# Patient Record
Sex: FEMALE | Race: WHITE
[De-identification: names, ages, dates, MRNs, and addresses within clinical notes are randomized per-mention and may not be internally consistent; named-entity substitution may affect disease eponyms.]

---

## 2017-10-19 ENCOUNTER — HOSPITAL ENCOUNTER (OUTPATIENT)
Dept: HOSPITAL 47 - RADMAMWWP | Age: 72
Discharge: HOME | End: 2017-10-19
Payer: MEDICARE

## 2017-10-19 DIAGNOSIS — Z08: Primary | ICD-10-CM

## 2017-10-19 DIAGNOSIS — Z85.3: ICD-10-CM

## 2017-10-19 NOTE — MM
Reason for exam: additional evaluation requested from prior study.

Last mammogram was performed 1 year ago.



History:

Patient is postmenopausal and has history of breast cancer at age 57.

Family history of breast cancer in maternal aunt, breast cancer in mother at age 

79, and breast cancer in paternal aunt.

Malignant stereotactic core biopsy of the right breast, May 8, 2003.  Mastectomy 

of the right breast, 2003.  TRAM Reconstruction of the right breast, 2003.  Core 

biopsy of the right breast.

Took estrogen for 2 years beginning at age 55.  Took antineoplastic for 5 years 

beginning at age 57.



Physical Findings:

Nurse did not find any significant physical abnormalities on exam.



MG 3D Diag Mammo W/Cad LT

CC, MLO, and ML view(s) were taken of the left breast.

Prior study comparison: October 17, 2016, left breast MG diagnostic mammo LT w 

CAD.  August 19, 2015, left breast MG diagnostic mammo LT w CAD.

The breast tissue is heterogeneously dense. This may lower the sensitivity of 

mammography.

Finding: There are typically benign fine diffuse/scattered calcifications in the 

left breast.

No significant changes in finding since October 17, 2016 and August 19, 2015.



These results were verbally communicated with the patient and result sheet given 

to the patient on 10/19/17.





ASSESSMENT: Benign, BI-RAD 2



RECOMMENDATION:

Follow-up diagnostic mammogram of the left breast in 1 year.

## 2018-09-20 ENCOUNTER — HOSPITAL ENCOUNTER (OUTPATIENT)
Dept: HOSPITAL 47 - LABWHC1 | Age: 73
Discharge: HOME | End: 2018-09-20
Attending: INTERNAL MEDICINE
Payer: MEDICARE

## 2018-09-20 DIAGNOSIS — R60.9: ICD-10-CM

## 2018-09-20 DIAGNOSIS — I48.1: Primary | ICD-10-CM

## 2018-09-20 LAB
ANION GAP SERPL CALC-SCNC: 4 MMOL/L
BUN SERPL-SCNC: 19 MG/DL (ref 7–17)
CALCIUM SPEC-MCNC: 9.6 MG/DL (ref 8.4–10.2)
CHLORIDE SERPL-SCNC: 107 MMOL/L (ref 98–107)
CO2 SERPL-SCNC: 31 MMOL/L (ref 22–30)
GLUCOSE SERPL-MCNC: 89 MG/DL (ref 74–99)
POTASSIUM SERPL-SCNC: 4.3 MMOL/L (ref 3.5–5.1)
SODIUM SERPL-SCNC: 142 MMOL/L (ref 137–145)

## 2018-09-20 PROCEDURE — 36415 COLL VENOUS BLD VENIPUNCTURE: CPT

## 2018-09-20 PROCEDURE — 80048 BASIC METABOLIC PNL TOTAL CA: CPT

## 2018-10-23 ENCOUNTER — HOSPITAL ENCOUNTER (OUTPATIENT)
Dept: HOSPITAL 47 - RADMAMWWP | Age: 73
Discharge: HOME | End: 2018-10-23
Payer: MEDICARE

## 2018-10-23 DIAGNOSIS — Z08: Primary | ICD-10-CM

## 2018-10-23 DIAGNOSIS — Z85.3: ICD-10-CM

## 2018-10-23 PROCEDURE — 77065 DX MAMMO INCL CAD UNI: CPT

## 2018-10-23 PROCEDURE — 77061 BREAST TOMOSYNTHESIS UNI: CPT

## 2018-10-24 NOTE — MM
Reason for exam: additional evaluation requested from prior study.

Last mammogram was performed 1 year ago.



History:

Patient is postmenopausal and has history of breast cancer at age 57.

Family history of breast cancer in maternal aunt, breast cancer in mother at age 

79, and breast cancer in paternal aunt.

Malignant stereotactic core biopsy of the right breast, May 8, 2003.  Mastectomy 

of the right breast, 2003.  TRAM Reconstruction of the right breast, 2003.  Core 

biopsy of the right breast.

Took estrogen for 2 years beginning at age 55.  Took antineoplastic for 5 years 

beginning at age 57.



Physical Findings:

Nurse did not find any significant physical abnormalities on exam.



MG 3D Diag Mammo W/Cad LT

CC and MLO view(s) were taken of the left breast.

Prior study comparison: October 19, 2017, left breast MG 3d diag mammo w/cad LT.  

October 17, 2016, left breast MG diagnostic mammo LT w CAD.

The breast tissue is heterogeneously dense. This may lower the sensitivity of 

mammography.  There is a new group of left upper outer quadrant calcifications at 

posterior depth measuring 6mm. These are suspicious and warrant biopsy.



These results were verbally communicated with the patient and result sheet given 

to the patient on 10/23/18.





ASSESSMENT: Suspicious, BI-RAD 4



RECOMMENDATION:

Stereotactic core biopsy of the left breast.



Called with mammographic findings and has scheduled an appointment for the patient

for 11/7/18 at 4:00 with Dr. Gutierrez.

Biopsy scheduled for 11/2/18 at 10:20.



PRELIMINARY REPORT CALLED AND FAXED TO DR. GUTIERREZ ON 10/23/18.

## 2018-11-02 ENCOUNTER — HOSPITAL ENCOUNTER (OUTPATIENT)
Dept: HOSPITAL 47 - RADMAMWWP | Age: 73
End: 2018-11-02
Payer: MEDICARE

## 2018-11-02 VITALS — RESPIRATION RATE: 16 BRPM

## 2018-11-02 VITALS — TEMPERATURE: 98.2 F | SYSTOLIC BLOOD PRESSURE: 147 MMHG | DIASTOLIC BLOOD PRESSURE: 71 MMHG | HEART RATE: 77 BPM

## 2018-11-02 VITALS — BODY MASS INDEX: 18.8 KG/M2

## 2018-11-02 DIAGNOSIS — N60.12: ICD-10-CM

## 2018-11-02 DIAGNOSIS — Z85.3: ICD-10-CM

## 2018-11-02 DIAGNOSIS — R92.1: ICD-10-CM

## 2018-11-02 DIAGNOSIS — D05.12: Primary | ICD-10-CM

## 2018-11-02 PROCEDURE — 88305 TISSUE EXAM BY PATHOLOGIST: CPT

## 2018-11-02 PROCEDURE — 88342 IMHCHEM/IMCYTCHM 1ST ANTB: CPT

## 2018-11-02 PROCEDURE — 88341 IMHCHEM/IMCYTCHM EA ADD ANTB: CPT

## 2018-11-02 PROCEDURE — 19081 BX BREAST 1ST LESION STRTCTC: CPT

## 2018-11-06 NOTE — MM
EXAMINATION TYPE: MG stereo VAD BX LT

 

DATE OF EXAM: 11/2/2018

 

COMPARISON: NONE

 

CLINICAL HISTORY: Abnormal mammogram

 

TECHNIQUE: Stereotactic guided core biopsy of left breast.  

 

FINDINGS: The procedure of stereotactic guided core biopsy was explained to the 
patient.  Benefits, alternatives, and risks were discussed.  An informed 
consent was then obtained.  

 

Timeout was performed.

 

The shortness pathway for biopsy was chosen.  Shortest pathway was cc from the 
top approach. Targeting was provided by radiology. Procedure was performed by 
radiology.

 

6 core samples were obtained, 2 each clock position biopsied. Specimen was 
evaluated. Calcifications are within the specimen. Clip was placed. This may 
has some retraction post decompression.

 

The patient was discharged home in stable condition having tolerated procedure 
well.

 

Post procedure mammogram was obtained.

 

IMPRESSION:

1. Successful stereotactic core biopsy left breast calcifications

 

Recommendations:

1. Recommendations are pending pathology results.

 

Pathology Results: Malignant



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
) arising in background flat epithelial atypia (FEA) with calcifications and 
fibrocystic changes. See Surgical Pathology Cancer Case Summary. 



Recommendation

Surgical consult of the left breast.

(therapy and follow up recommended)
SHARI

## 2019-09-04 ENCOUNTER — HOSPITAL ENCOUNTER (OUTPATIENT)
Dept: HOSPITAL 47 - RADMAMWWP | Age: 74
Discharge: HOME | End: 2019-09-04
Attending: FAMILY MEDICINE
Payer: MEDICARE

## 2019-09-04 DIAGNOSIS — M81.0: ICD-10-CM

## 2019-09-04 DIAGNOSIS — N60.92: Primary | ICD-10-CM

## 2019-09-04 DIAGNOSIS — M85.80: ICD-10-CM

## 2019-09-04 PROCEDURE — 77061 BREAST TOMOSYNTHESIS UNI: CPT

## 2019-09-04 PROCEDURE — 77080 DXA BONE DENSITY AXIAL: CPT

## 2019-09-04 PROCEDURE — 77065 DX MAMMO INCL CAD UNI: CPT

## 2019-09-04 NOTE — MM
Reason for exam: history of breast cancer, mastectomy.

Last mammogram was performed 10 months ago.



History:

Patient is postmenopausal, has history of breast cancer at age 73, and has history

of other cancer at age 57.

Family history of breast cancer in maternal aunt, breast cancer in mother at age 

79, and breast cancer in paternal aunt.

Malignant MG stereo VAD BX LT of the left breast, November 2, 2018.  Lumpectomy of

the left breast, November 2018.  Malignant stereotactic core biopsy of the right 

breast, May 8, 2003.  Mastectomy of the right breast, 2003.  TRAM Reconstruction 

of the right breast, 2003.  Core biopsy of the right breast.

Took estrogen for 2 years beginning at age 55.  Took antineoplastic for 5 years 

beginning at age 57.



Physical Findings:

Nurse did not find any significant physical abnormalities on exam.



MG 3D Diag Mammo W/Cad LT

CC, MLO, and XCCL view(s) were taken of the left breast.

Prior study comparison: October 23, 2018, left breast MG 3d diag mammo w/cad LT.  

October 19, 2017, left breast MG 3d diag mammo w/cad LT.

The breast tissue is heterogeneously dense. This may lower the sensitivity of 

mammography.  Benign appearing calcifications in the left breast. Post excisional 

change on the left.



These results were verbally communicated with the patient and result sheet given 

to the patient on 9/4/19.





ASSESSMENT: Benign, BI-RAD 2



RECOMMENDATION:

Surgical consultation of the left breast.



Called with mammographic findings and has scheduled an appointment for the patient

for 9/11/19 at 8:00 with Dr. Gutierrez.



PRELIMINARY REPORT CALLED AND FAXED TO DR. GUTIERREZ ON 9/4/19.

## 2019-09-04 NOTE — BD
EXAMINATION TYPE: Axial Bone Density

 

DATE OF EXAM: 9/4/2019

 

COMPARISON: 2015

 

CLINICAL HISTORY: M [age]

 

Height:  5 FT 1 IN

Weight:  109

 

FRAX RISK QUESTIONS:

 

History of Fracture in Adulthood: YES

      

 

RISK FACTORS 

HISTORY OF: 

Active: YES

Postmenopausal woman: AGE 48-49

Take estrogen and/or progesterone medications: TOOK HRT [age]

Poor Health: FAIR

 

MEDICATIONS: 

Additional Medications: BUPPORION HCL, SIMVASTATIN, ZERALTO,  XANAX AS NEEDED

 

 

Additional History:

 

 

EXAM MEASUREMENTS: 

Bone mineral densitometry was performed using the [employer].

Bone mineral density as measured about the Lumbar spine is:  

----- L1-L4(G/cm2): 0.905

T Score Values are as follows:

----- L2: -1.9

----- L3: -2.0

----- L4: -3.1

----- L1-L4: -2.3

Bone mineral density has: DECREASED  -4.0 % since study of: 2015

 

Bone mineral density about the R hip (g/cm2): 0.813

Bone mineral density about the L hip (g/cm2): 0.875

T Score values are as follows:

-----R Neck: -1.6

-----L Neck: -1.2

-----R Total: -1.5

-----L Total: -1.4

Bone mineral density has: DECREASED  -7.3 % since study of: 2015

 

 

IMPRESSION:

Osteopenia (T Score between -2.5 and -1). Overall values of the lumbar spine approach osteoporosis.

 

There is slightly increased risk of fracture and the patient may be considered 

for treatment. 

 

Re-Screen 2-5 years.

 

 

 

 

 

NOTE:  T-SCORE=SD OF THE YOUNG ADULT MEAN.

## 2020-08-28 ENCOUNTER — HOSPITAL ENCOUNTER (OUTPATIENT)
Dept: HOSPITAL 47 - RADMAMWWP | Age: 75
Discharge: HOME | End: 2020-08-28
Attending: FAMILY MEDICINE
Payer: MEDICARE

## 2020-08-28 DIAGNOSIS — R92.8: Primary | ICD-10-CM

## 2020-08-28 PROCEDURE — 77065 DX MAMMO INCL CAD UNI: CPT

## 2020-08-28 PROCEDURE — 77061 BREAST TOMOSYNTHESIS UNI: CPT

## 2020-08-28 NOTE — MM
Reason for exam: additional evaluation requested from prior study.

Last mammogram was performed 1 year ago.



History:

Patient is postmenopausal, has history of breast cancer at age 73, and has history

of other cancer at age 57.

Family history of breast cancer in maternal aunt, breast cancer in mother at age 

79, and breast cancer in paternal aunt.

Malignant MG stereo VAD BX LT of the left breast, November 2, 2018.  Lumpectomy of

the left breast, November 2018.  Malignant stereotactic core biopsy of the right 

breast, May 8, 2003.  Mastectomy of the right breast, 2003.  TRAM Reconstruction 

of the right breast, 2003.  Core biopsy of the right breast.

Took estrogen for 2 years beginning at age 55.  Took antineoplastic for 5 years 

beginning at age 57.



Physical Findings:

Nurse did not find any significant physical abnormalities on exam.



MG 3D Diag Mammo W/Cad LT

CC and MLO view(s) were taken of the left breast.

Prior study comparison: September 4, 2019, left breast MG 3d diag mammo w/cad LT. 

October 23, 2018, left breast MG 3d diag mammo w/cad LT.

The breast tissue is heterogeneously dense. This may lower the sensitivity of 

mammography.  Stable benign calcifications. There is no discrete abnormality.

No significant new findings when compared with previous films.



These results were verbally communicated with the patient and result sheet given 

to the patient on 8/28/20.





ASSESSMENT: Benign, BI-RAD 2



RECOMMENDATION:

Follow-up diagnostic mammogram of the left breast in 1 year.

## 2021-09-10 ENCOUNTER — HOSPITAL ENCOUNTER (OUTPATIENT)
Dept: HOSPITAL 47 - RADBDWWP | Age: 76
Discharge: HOME | End: 2021-09-10
Attending: FAMILY MEDICINE
Payer: MEDICARE

## 2021-09-10 DIAGNOSIS — M81.0: Primary | ICD-10-CM

## 2021-09-10 DIAGNOSIS — Z80.3: ICD-10-CM

## 2021-09-10 DIAGNOSIS — M85.851: ICD-10-CM

## 2021-09-10 PROCEDURE — 77080 DXA BONE DENSITY AXIAL: CPT

## 2021-09-10 NOTE — BD
EXAMINATION TYPE: Axial Bone Density

 

DATE OF EXAM: 9/10/2021

 

COMPARISON: 09.04.2019

 

CLINICAL HISTORY: 75 YR OLD FEMALE......ICD-10 CODE:    M85.80 DISORDER OF BD

 

Height:  61

Weight:  108

 

FRAX RISK QUESTIONS:

History of Fracture in Adulthood: YES

 

RISK FACTORS 

HISTORY OF: 

HX OF RIB FXs  >48 YRS OLD

Family History of Osteoporosis: YES, HER MOTHER

Postmenopausal woman:  YES, AT AGE 46 YRS OLD, HRT FOR 2-3 YRS ONLY, NONE NOW

Lost more than 2 inches in height since high school: YES

Frequent falls: ELDERLY

Poor Health: FAIR

Hyperparathyroidism: NO

Adrenal Insufficiency: NO

 

MEDICATIONS: 

Osteoporosis Medications: YES, IN THE PAST ONLY, NOTHING FOR ABOUT 10 -20 YRS

Additional Medications: XANAX, ANTIDEPRESSANT, HX OF BREAST CANCER, ARIMIDEX AND ANTI HORMONE, REFLUX
 MEDS, STATIN FOR CHOLESTEROL, VIT D AND CALCIUM  ,  

Additional History: ANXIETY/DEPRESSION, HX OF BREAST CANCER, REFLUX, CHOLESTEROL

 

EXAM MEASUREMENTS: 

Bone mineral densitometry was performed using the [employer] System.

Bone mineral density as measured about the Lumbar spine is:  

----- L1-L4(G/cm2): 0.909

T Score Values are as follows:

----- L1:  -2.1

----- L2:  -2.4

----- L3:  -2.0

----- L4:  -2.6

----- L1-L4:  -2.3

Bone mineral density has: Increased 0.8% since study of: 09.04.2019

 

Bone mineral density about the R hip (g/cm2): 0.835

Bone mineral density about the L hip (g/cm2):  0.863

T Score values are as follows:

-----R Neck:  -1.3

-----L Neck:  -0.9

-----R Total:  -1.4

-----L Total:  -1.1

Bone mineral density has: Increased 3.2% since study of: 09.04.2019

 

FRAX%s:     THERE IS A 15.5% CHANCE FOR A MAJOR OSTEOPOROTIC FX AND A 3.2% FOR HIP.....PROBABILITY FO
R FX IN 10 YRS TIME

 

IMPRESSION:

Osteopenia

 

NOTE:  T-SCORE=SD OF THE YOUNG ADULT MEAN.

## 2021-12-02 NOTE — FL
EXAMINATION TYPE: FL UGI w small bowel

 

DATE OF EXAM: 12/2/2021

 

COMPARISON: NONE

 

HISTORY: Epigastric and abdominal pain

 

TECHNIQUE:  A double contrast UGI study is performed with small bowel follow through.  A total of 60 
seconds of fluoroscopic time was utilized during procedure and 69 images obtained.

 

FINDINGS:   image of the abdomen shows overall nonobstructive bowel gas pattern. Scattered bilat
eral pelvic phleboliths. Surgical clips right pelvis. Additional surgical clip right upper quadrant

 

The esophagus shows satisfactory motility and emptying into the stomach.  Tiny diverticulum in the hy
popharynx suspected Gaston Sarah type is present near the level of the piriform sinuses see image
 9, also identified and several additional images. It appears to clear spontaneously. No evidence of 
fixed  hiatal hernia or stricture noted.

 

The stomach shows satisfactory distensibility, peristalsis, and mucosal folds.  No evidence of any ma
ss or ulcer disease. Moderate amount of gastroesophageal reflux was seen during real time performance
 of this study.

 

The duodenal bulb and sweep are within normal limits.

 

The small bowel study shows normal transit to the colon in less than 140 minutes.  There is normal mu
cosal fold pattern throughout the small bowel.  There is no evidence of any stricture or filling defe
ct noted.  The terminal ileum is spotted and appears within normal limits.

 

IMPRESSION:  Normal upper GI study and small bowel follow through.

## 2022-09-16 NOTE — MM
Reason for Exam: Additional evaluation requested from prior study. 

Last screening mammogram was performed 12 month(s) ago.





Patient History: 

Menarche at age 10. First Full-Term Pregnancy at age 25. Hysterectomy at age 38. Postmenopausal.

Breast cancer, bilateral, age 73. Other cancer, age 57. Estrogen for 2 years from age 55 until age

57. 2003, Mastectomy on the Right side. Core Biopsy on the Right side. 11/2018, Lumpectomy on the

Left side. 11/2/2018, Malignant Core Biopsy on the left side. 5/8/2003, Malignant Stereotactic Core

Biopsy on the right side. 2003, TRAM Reconstruction on the right side.

Paternal aunt had breast cancer. Maternal aunt had breast cancer. Mother had breast cancer, age 79. 





Prior Study Comparison: 

9/4/2019 Left Diagnostic Mammogram, [facility]. 8/28/2020 Left Diagnostic Mammogram, [facility]. 9/1/2021 Left

Diagnostic Mammogram, [facility]. 





Tissue Density: 

Left: The breast tissue is heterogeneously dense. This may lower the sensitivity of mammography.





Findings: 

Analyzed By CAD. 

Pattern appears stable. Benign punctate calcifications are present.



On the left mediolateral oblique view there is a rounded density with partially obscured margins 3

cm from the nipple measuring approximately 0.6 cm. Upon additional review and with comparison this

appears to be an interval change. Patient should be recalled for ultrasound of the left breast with

attention to the lower mid left breast. 





Overall Assessment: Incomplete: need additional imaging evaluation, BI-RAD 0





Management: 

Diagnostic Breast Ultrasound of the left breast.

A clinical breast exam by your physician is recommended on an annual basis and results should be

correlated with mammographic findings.  This exam should not preclude additional follow-up of

suspicious palpable abnormalities.  Results were given to the patient verbally at the time of exam.



Electronically signed and approved by: Mir Escobar D.O. Radiologis

## 2022-10-03 NOTE — USB
Patient History: 

Menarche at age 10. First Full-Term Pregnancy at age 25. Hysterectomy at age 38. Postmenopausal.

Breast cancer, bilateral, age 73. Other cancer, age 57. Estrogen for 2 years from age 55 until age

57. 2003, Mastectomy on the Right side. Core Biopsy on the Right side. 11/2018, Lumpectomy on the

Left side. 11/2/2018, Malignant Core Biopsy on the left side. 5/8/2003, Malignant Stereotactic Core

Biopsy on the right side. 2003, TRAM Reconstruction on the right side.

Paternal aunt had breast cancer. Maternal aunt had breast cancer. Mother had breast cancer, age 79. 





Prior Study Comparison: 

8/28/2020 Left Diagnostic Mammogram, [facility]. 9/1/2021 Left Diagnostic Mammogram, [facility]. 9/7/2022 Left MG

3D diag mammo w/cad LT, [facility]. 





Findings: 

The whole breast of the left breast, the axilla of the left breast and the retroareolar of the left

breast were scanned.

A complete US of all four quadrants of the breast and retro-areolar region were reviewed.

A hypoechoic lesion in the left breast 5:00 3 cm from nipple measuring up to 4 mm with smooth

margins and possible thin septation. This is favored to represent a complicated cyst.

Left breast at 5:00 at 5 cm from the nipple is a irregular shaped hypoechoic area measuring up to 2

mm. Etiology is unclear given its small size. 





Overall Assessment: Probably benign, BI-RAD 3





Management: 

Diagnostic Breast Ultrasound of the left breast in 6 months.

Short-term follow-up for the 2 mm lesion in the left breast at 5:00 5 cm from the nipple and likely

complicated cyst 3 cm from the nipple. A clinical breast exam by your physician is recommended on an

annual basis and results should be correlated with mammographic findings.



Electronically signed and approved by: Don Dunn DO

## 2023-05-27 NOTE — MR
EXAMINATION TYPE: MR brain wo/w con

 

DATE OF EXAM: 5/27/2023

 

COMPARISON: NONE

 

HISTORY: Memory impairment, blurry vision since cataract removed

 

TECHNIQUE: 

Multiplanar, multisequence images of the brain and brainstem is performed without and with IV contras
t, utilizing 5 mL intravenous Gadavist .

 

FINDINGS: Diffusion weighted images demonstrate no evidence of a recent infarct or other diffusion ab
normality. There is mild ventricular and sulcal prominence with caval septum pellucidum. There are sc
attered foci of T2 hyperintensity seen throughout the white left matter bilaterally.

 

Midline structures demonstrate normal morphology.  The craniocervical junction appears within normal 
limits.  Post contrast images demonstrate no abnormal enhancement. The dural venous sinuses appear pa
tent. The visualized sinuses are clear. Bilateral aphakia is noted.

 

IMPRESSION: Mild-to-moderate diffuse cerebral atrophy and mild chronic small vessel ischemic change. 
No abnormal enhancement identified.

## 2023-09-15 NOTE — XR
EXAMINATION TYPE: XR chest 2V

 

DATE OF EXAM: 9/15/2023

 

COMPARISON: 10/18/2012

 

HISTORY: Shortness of breath

 

TECHNIQUE:  Frontal and lateral views of the chest are obtained.

 

FINDINGS:

 

Scattered senescent parenchymal changes noted. Hyperinflation compatible with COPD. 

 

No evidence for infiltrate. No evidence for atelectasis.

 

Heart size is stable.

 

Mediastinal structures are stable and grossly unremarkable.

 

No evidence for hilar prominence.

 

Degenerative changes dorsal spine. 

 

IMPRESSION:

1. No evidence for acute pulmonary disease.

## 2023-09-15 NOTE — ED
General Adult HPI





- General


Chief complaint: Chest Pain


Stated complaint: Chest Pain


Time Seen by Provider: 09/15/23 12:40


Source: patient, EMS, RN notes reviewed, old records reviewed


Mode of arrival: EMS


Limitations: no limitations





- History of Present Illness


Initial comments: 





This is an 78-year-old female who presents to the emergency department com

plaining of sharp pain just below her left breast.  Patient states she was at a 

restaurant and she ate a full lunch  for lunch and shortly thereafter she 

experiences extremely sharp pain just under her breasts lasted about 5 minutes 

and then subsided.  Patient denies any pain since patient denies any heaviness 

in the chest or discomfort.  Patient denies any shortness of breath or 

difficulty breathing.  Patient denies any palpitations.  Patient denies any 

diaphoretic episodes.  Patient denies any nausea vomiting.  Patient denies any 

back pain or radiation of the pain.  Patient denies any recent fever chills or 

cough per patient states she feels completely at her baseline and is asking to 

be discharged already.





- Related Data


                                Home Medications











 Medication  Instructions  Recorded  Confirmed


 


ALPRAZolam [Xanax] 0.25 mg PO DAILY PRN 10/29/18 11/02/18


 


Cranberry Fruit Extract [Cranberry] 500 mg PO DAILY 10/29/18 11/02/18


 


L.acidoph,Paracasei, B.lactis 1 each PO DAILY 10/29/18 11/02/18





[Probiotic]   


 


Melatonin [Melatonin ER] 10 mg PO DAILY 10/29/18 11/02/18


 


Metoprolol Succinate [Toprol XL] 25 mg PO DAILY 10/29/18 11/02/18


 


Rivaroxaban [Xarelto] 20 mg PO DAILY 10/29/18 11/02/18


 


Simvastatin [Zocor] 10 mg PO HS 10/29/18 11/02/18


 


buPROPion [Wellbutrin] 75 mg PO BID 10/29/18 11/02/18











                                    Allergies











Allergy/AdvReac Type Severity Reaction Status Date / Time


 


sulfamethoxazole Allergy  Rash/Hives Verified 09/15/23 12:17





[From Bactrim]     


 


trimethoprim [From Bactrim] Allergy  Rash/Hives Verified 09/15/23 12:17














Review of Systems


ROS Statement: 


Those systems with pertinent positive or pertinent negative responses have been 

documented in the HPI.





ROS Other: All systems not noted in ROS Statement are negative.





Past Medical History


Past Medical History: Cancer, Hyperlipidemia


Additional Past Medical History / Comment(s): Right breast cancer 


History of Any Multi-Drug Resistant Organisms: None Reported


Past Surgical History: Appendectomy, Breast Surgery,  Section, 

Hysterectomy, Tonsillectomy


Additional Past Surgical History / Comment(s): Mastectomy right breast  with

TRAM reconstruction. D & C.


Past Anesthesia/Blood Transfusion Reactions: Previous Problems w/ Anesthesia, 

Postoperative Nausea & Vomiting (PONV)


Past Psychological History: Anxiety, Depression


Smoking Status: Never smoker


Past Alcohol Use History: Rare


Past Drug Use History: None Reported





General Exam





- General Exam Comments


Initial Comments: 





GENERAL:


Patient is well-developed and well-nourished.  Patient is nontoxic and well-

hydrated and is in no acute distress.





ENT:


Neck is soft and supple.  No significant lymphadenopathy is noted.  Oropharynx 

is clear.  Moist mucous membranes.  Neck has full range of motion without 

eliciting any pain.  





EYES:


The sclera were anicteric and conjunctiva were pink and moist.  Extraocular 

movements were intact and pupils were equal round and reactive to light.  

Eyelids were unremarkable.





PULMONARY:


Unlabored respirations.  Good breath sounds bilaterally.  No audible rales 

rhonchi or wheezing was noted.





CARDIOVASCULAR:


There is a regular rate and rhythm without any murmurs gallops or rubs.  





ABDOMEN:


Soft and nontender with normal bowel sounds.  





SKIN:


Skin is clear with no lesions or rashes and otherwise unremarkable.





NEUROLOGIC:


Patient is alert and oriented x3.  Cranial nerves II through XII are grossly 

intact.  Motor and sensory are also intact.  Normal speech, volume and content. 

Symmetrical smile.  





MUSCULOSKELETAL:


Normal extremities with adequate strength and full range of motion.  





LYMPHATICS:


No significant lymphadenopathy is noted





PSYCHIATRIC:


Normal psychiatric evaluation.  


Limitations: no limitations





Course


                                   Vital Signs











  09/15/23





  12:14


 


Temperature 98.1 F


 


Pulse Rate 70


 


Respiratory 18





Rate 


 


Blood Pressure 170/64


 


O2 Sat by Pulse 99





Oximetry 














Medical Decision Making





- Medical Decision Making





EKG is interpreted by myself.  EKG shows sinus rhythm at 60 bpm KS interval is 

166 years 102 QT interval 36 QTC is 44.  Patient's EKG shows no ST segment 

elevation or depression.





Was pt. sent in by a medical professional or institution (, PA, NP, urgent 

care, hospital, or nursing home...) When possible be specific


@  -No


Did you speak to anyone other than the patient for history (EMS, parent, family,

police, friend...)? What history was obtained from this source 


@  -No


Did you review nursing and triage notes (agree or disagree)?  Why? 


@  -I reviewed and agree with nursing and triage notes


Were old charts reviewed (outside hosp., previous admission, EMS record, old 

EKG, old radiological studies, urgent care reports/EKG's, nursing home records)?

Report findings 


@  -I reviewed prior charts from prior lab work


Differential Diagnosis (chest pain, altered mental status, abdominal pain women,

abdominal pain men, vaginal bleeding, weakness, fever, dyspnea, syncope, 

headache, dizziness, GI bleed, back pain, seizure, CVA, palpatations, mental 

health, musculoskeletal)? 


@  -Differential Chest Pain:


Stable Angina, Unstable Angina, STEMI, NSTEMI Aortic Dissection, Pneumothorax, 

Musculoskeletal, Esophageal Spasm GERD, Cholecystitis, Pancreatitis, Zoster, 

this is not meant to be an all-inclusive list. 


EKG interpreted by me (3pts min.).


@  -As above


X-rays interpreted by me (1pt min.).


@  -Chest x-ray shows no acute abnormality


CT interpreted by me (1pt min.).


@  -None done


U/S interpreted by me (1pt. min.).


@  -None done


What testing was considered but not performed or refused? (CT, X-rays, U/S, 

labs)? Why?


@  -None


What meds were considered but not given or refused? Why?


@  -None


Did you discuss the management of the patient with other professionals 

(professionals i.e. , PA, NP, lab, RT, psych nurse, , , 

teacher, , )? Give summary


@  -No


Was smoking cessation discussed for >3mins.?


@  -No


Was critical care preformed (if so, how long)?


@  -No


Were there social determinants of health that impacted care today? How? 

(Homelessness, low income, unemployed, alcoholism, drug addiction, 

transportation, low edu. Level, literacy, decrease access to med. care, [facility], 

rehab)?


@  -No


Was there de-escalation of care discussed even if they declined (Discuss DNR or 

withdrawal of care, Hospice)? DNR status


@  -No


What co-morbidities impacted this encounter? (DM, HTN, Smoking, COPD, CAD, 

Cancer, CVA, ARF, Chemo, Hep., AIDS, mental health diagnosis, sleep apnea, 

morbid obesity)?


@  -None


Was patient admitted / discharged? Hospital course, mention meds given and 

route, prescriptions, significant lab abnormalities, going to OR and other 

pertinent info.


@  -Patient remained asymptomatic throughout her ED course and states that she 

does not want to be here she wants to go home today and she will follow-up as 

needed 


Undiagnosed new problem with uncertain prognosis?


@  -No


Drug Therapy requiring intensive monitoring for toxicity (Heparin, Nitro, 

Insulin, Cardizem)?


@  -No


Were any procedures done?


@  -No


Diagnosis/symptom?


@  -Atypical Chest pain


Acute, or Chronic, or Acute on Chronic?


@  -Acute


Uncomplicated (without systemic symptoms) or Complicated (systemic symptoms)?


@  -Comp related


Side effects of treatment?


@  -No


Exacerbation, Progression, or Severe Exacerbation?


@  -No


Poses a threat to life or bodily function? How? (Chest pain, USA, MI, pneumonia,

PE, COPD, DKA, ARF, appy, cholecystitis, CVA, Diverticulitis, Homicidal, 

Suicidal, threat to staff... and all critical care pts)


@  -No





- Lab Data


Result diagrams: 


                                 09/15/23 13:21





                                 09/15/23 13:21


                                   Lab Results











  09/15/23 09/15/23 09/15/23 Range/Units





  13:21 13:21 13:21 


 


WBC  5.6    (3.8-10.6)  k/uL


 


RBC  4.27    (3.80-5.40)  m/uL


 


Hgb  13.6    (11.4-16.0)  gm/dL


 


Hct  40.5    (34.0-46.0)  %


 


MCV  94.8    (80.0-100.0)  fL


 


MCH  31.8    (25.0-35.0)  pg


 


MCHC  33.6    (31.0-37.0)  g/dL


 


RDW  12.5    (11.5-15.5)  %


 


Plt Count  209    (150-450)  k/uL


 


MPV  7.6    


 


Neutrophils %  61    %


 


Lymphocytes %  28    %


 


Monocytes %  7    %


 


Eosinophils %  2    %


 


Basophils %  0    %


 


Neutrophils #  3.4    (1.3-7.7)  k/uL


 


Lymphocytes #  1.6    (1.0-4.8)  k/uL


 


Monocytes #  0.4    (0-1.0)  k/uL


 


Eosinophils #  0.1    (0-0.7)  k/uL


 


Basophils #  0.0    (0-0.2)  k/uL


 


PT   12.0   (9.0-12.0)  sec


 


INR   1.2 H   (<1.2)  


 


APTT   29.0   (22.0-30.0)  sec


 


Sodium    138  (137-145)  mmol/L


 


Potassium    3.7  (3.5-5.1)  mmol/L


 


Chloride    106  ()  mmol/L


 


Carbon Dioxide    26  (22-30)  mmol/L


 


Anion Gap    6  mmol/L


 


BUN    17  (7-17)  mg/dL


 


Creatinine    0.75  (0.52-1.04)  mg/dL


 


Est GFR (CKD-EPI)AfAm    87  (>60 ml/min/1.73 sqM)  


 


Est GFR (CKD-EPI)NonAf    76  (>60 ml/min/1.73 sqM)  


 


Glucose    108 H  (74-99)  mg/dL


 


Calcium    9.3  (8.4-10.2)  mg/dL


 


Magnesium    2.0  (1.6-2.3)  mg/dL


 


Total Bilirubin    0.3  (0.2-1.3)  mg/dL


 


AST    31  (14-36)  U/L


 


ALT    22  (4-34)  U/L


 


Alkaline Phosphatase    50  ()  U/L


 


Troponin I     (0.000-0.034)  ng/mL


 


Total Protein    6.0 L  (6.3-8.2)  g/dL


 


Albumin    3.6  (3.5-5.0)  g/dL














  09/15/23 Range/Units





  13:21 


 


WBC   (3.8-10.6)  k/uL


 


RBC   (3.80-5.40)  m/uL


 


Hgb   (11.4-16.0)  gm/dL


 


Hct   (34.0-46.0)  %


 


MCV   (80.0-100.0)  fL


 


MCH   (25.0-35.0)  pg


 


MCHC   (31.0-37.0)  g/dL


 


RDW   (11.5-15.5)  %


 


Plt Count   (150-450)  k/uL


 


MPV   


 


Neutrophils %   %


 


Lymphocytes %   %


 


Monocytes %   %


 


Eosinophils %   %


 


Basophils %   %


 


Neutrophils #   (1.3-7.7)  k/uL


 


Lymphocytes #   (1.0-4.8)  k/uL


 


Monocytes #   (0-1.0)  k/uL


 


Eosinophils #   (0-0.7)  k/uL


 


Basophils #   (0-0.2)  k/uL


 


PT   (9.0-12.0)  sec


 


INR   (<1.2)  


 


APTT   (22.0-30.0)  sec


 


Sodium   (137-145)  mmol/L


 


Potassium   (3.5-5.1)  mmol/L


 


Chloride   ()  mmol/L


 


Carbon Dioxide   (22-30)  mmol/L


 


Anion Gap   mmol/L


 


BUN   (7-17)  mg/dL


 


Creatinine   (0.52-1.04)  mg/dL


 


Est GFR (CKD-EPI)AfAm   (>60 ml/min/1.73 sqM)  


 


Est GFR (CKD-EPI)NonAf   (>60 ml/min/1.73 sqM)  


 


Glucose   (74-99)  mg/dL


 


Calcium   (8.4-10.2)  mg/dL


 


Magnesium   (1.6-2.3)  mg/dL


 


Total Bilirubin   (0.2-1.3)  mg/dL


 


AST   (14-36)  U/L


 


ALT   (4-34)  U/L


 


Alkaline Phosphatase   ()  U/L


 


Troponin I  <0.012  (0.000-0.034)  ng/mL


 


Total Protein   (6.3-8.2)  g/dL


 


Albumin   (3.5-5.0)  g/dL














Disposition


Clinical Impression: 


 Atypical chest pain





Disposition: HOME SELF-CARE


Instructions (If sedation given, give patient instructions):  Chest Pain (ED)


Is patient prescribed a controlled substance at d/c from ED?: No


Referrals: 


Kacie Ramirez MD [Primary Care Provider] - 1-2 days


Time of Disposition: 14:18